# Patient Record
Sex: FEMALE | Race: WHITE | Employment: FULL TIME | URBAN - METROPOLITAN AREA
[De-identification: names, ages, dates, MRNs, and addresses within clinical notes are randomized per-mention and may not be internally consistent; named-entity substitution may affect disease eponyms.]

---

## 2024-08-07 RX ORDER — DOCUSATE SODIUM 100 MG/1
100 CAPSULE, LIQUID FILLED ORAL 2 TIMES DAILY PRN
COMMUNITY

## 2024-08-09 NOTE — H&P
HISTORY AND PHYSICAL             Date: 8/9/2024        Patient Name: Genie Kovacs     YOB: 1970      Age:  54 y.o.    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  She presents for excision with frozen section control and local reconstruction.    Past Medical History   History reviewed. No pertinent past medical history.     Past Surgical History     Past Surgical History:   Procedure Laterality Date    BREAST SURGERY      benign  bilaterally    COLONOSCOPY          Medications Prior to Admission     Prior to Admission medications    Medication Sig Start Date End Date Taking? Authorizing Provider   docusate sodium (COLACE) 100 MG capsule Take 1 capsule by mouth 2 times daily as needed for Constipation   Yes Provider, Historical, MD        Allergies   Penicillins and Zithromax [azithromycin]    Social History     Social History       Tobacco History       Smoking Status  Never      Smokeless Tobacco Use  Never              Alcohol History       Alcohol Use Status  Not Asked              Drug Use       Drug Use Status  Not Asked              Sexual Activity       Sexually Active  Not Asked                    Family History   History reviewed. No pertinent family history.    Review of Systems   Review of Systems    Physical Exam   Ht 1.829 m (6')   Wt 61.2 kg (135 lb)   BMI 18.31 kg/m²     Physical Exam  Heart regular rate rhythm  Lungs clear to auscultation bilaterally  Raised nodular nonpigmented skin lesions right medial canthal area and left lateral nasal sidewall, probable basal cell carcinomas    Labs    No results found for this or any previous visit (from the past 24 hour(s)).     Imaging/Diagnostics Last 24 Hours   No results

## 2024-08-12 ENCOUNTER — HOSPITAL ENCOUNTER (OUTPATIENT)
Age: 54
Setting detail: OUTPATIENT SURGERY
Discharge: HOME | End: 2024-08-12
Attending: PLASTIC SURGERY | Admitting: PLASTIC SURGERY
Payer: COMMERCIAL

## 2024-08-12 VITALS
BODY MASS INDEX: 18.28 KG/M2 | DIASTOLIC BLOOD PRESSURE: 61 MMHG | HEART RATE: 59 BPM | TEMPERATURE: 97 F | RESPIRATION RATE: 16 BRPM | OXYGEN SATURATION: 99 % | SYSTOLIC BLOOD PRESSURE: 112 MMHG | HEIGHT: 72 IN | WEIGHT: 135 LBS

## 2024-08-12 DIAGNOSIS — C44.111 BASAL CELL CARCINOMA (BCC) OF RIGHT ORBIT: ICD-10-CM

## 2024-08-12 DIAGNOSIS — C44.311 BASAL CELL CARCINOMA OF LEFT NASAL SIDEWALL: ICD-10-CM

## 2024-08-12 PROCEDURE — 7100000010 HC PHASE II RECOVERY - FIRST 15 MIN: Performed by: PLASTIC SURGERY

## 2024-08-12 PROCEDURE — 3600000004 HC SURGERY LEVEL 4 BASE: Performed by: PLASTIC SURGERY

## 2024-08-12 PROCEDURE — 2709999900 HC NON-CHARGEABLE SUPPLY: Performed by: PLASTIC SURGERY

## 2024-08-12 PROCEDURE — 6360000002 HC RX W HCPCS: Performed by: PLASTIC SURGERY

## 2024-08-12 PROCEDURE — 3600000014 HC SURGERY LEVEL 4 ADDTL 15MIN: Performed by: PLASTIC SURGERY

## 2024-08-12 PROCEDURE — 6370000000 HC RX 637 (ALT 250 FOR IP): Performed by: PLASTIC SURGERY

## 2024-08-12 PROCEDURE — 7100000011 HC PHASE II RECOVERY - ADDTL 15 MIN: Performed by: PLASTIC SURGERY

## 2024-08-12 PROCEDURE — 2500000003 HC RX 250 WO HCPCS: Performed by: PLASTIC SURGERY

## 2024-08-12 PROCEDURE — 88305 TISSUE EXAM BY PATHOLOGIST: CPT

## 2024-08-12 RX ORDER — GINSENG 100 MG
CAPSULE ORAL PRN
Status: DISCONTINUED | OUTPATIENT
Start: 2024-08-12 | End: 2024-08-12 | Stop reason: ALTCHOICE

## 2024-08-12 ASSESSMENT — PAIN - FUNCTIONAL ASSESSMENT
PAIN_FUNCTIONAL_ASSESSMENT: 0-10

## 2024-08-12 NOTE — OP NOTE
Operative Note      Patient: Genie Kovacs  YOB: 1970  MRN: [id number]    Date of Procedure: 8/12/2024      Preoperative diagnosis #1: Probable basal cell carcinoma left lateral nasal sidewall  Preoperative diagnosis #2: Probable basal cell carcinoma right medial canthal area      Post-Op Diagnosis: Same       Procedure #1: Excision of probable basal cell carcinoma left lateral nasal sidewall measuring 1 x 1 cm  Procedure #2: Flap reconstruction left lateral nasal sidewall measuring 4 cm²  Procedure #3: Excision of probable basal cell carcinoma right medial canthal area measuring 1.2 x 1 cm  Procedure #4: Flap reconstruction right medial canthal area measuring 4.5 cm²    Surgeon(s):  Terence Pruitt MD    Assistant:   * No surgical staff found *    Anesthesia: Local    Estimated Blood Loss (mL): Minimal    Complications: None    Specimens:   ID Type Source Tests Collected by Time Destination   A : BASAL CELL CARCINOMA LEFT NASAL SIDEWALL STITCH AT 12 O'CLOCK Tissue Tissue SURGICAL PATHOLOGY Terence Pruitt MD 8/12/2024 0756    B : BASAL CELL CARCINOMA RIGHT MEDIAL CANTHAL AREA STITCH AT 12 O'CLOCK Tissue Tissue SURGICAL PATHOLOGY Terence Pruitt MD 8/12/2024 0830        Implants:  * No implants in log *      Drains: * No LDAs found *    Findings:  Infection Present At Time Of Surgery (PATOS) (choose all levels that have infection present):  No infection present  Other Findings: none  This procedure was not performed to treat primary cutaneous melanoma through wide local excision    Detailed Description of Procedure:     This is a 54-year-old female with what appears to be a basal cell carcinomas of the left lateral nasal sidewall and right medial canthal area.  Complete excision of these probable skin cancers are recommended.  Originally the patient was scheduled to undergo excision with frozen section control.  However, due to a scheduling error, the pathologist was not available.  Prior

## 2024-08-12 NOTE — DISCHARGE INSTRUCTIONS
No strenuous activity.  Keep dressings clean dry and intact.  Tylenol and or ibuprofen as needed for pain control.  Sponge bathe only until seen in follow-up.    Follow-up with me tomorrow.       Infection After Surgery: Care Instructions  Overview  After surgery, an infection is always possible. It doesn't mean that the surgery didn't go well.  Because an infection can be serious, your doctor has taken steps to manage it.  Your doctor checked the infection and cleaned it if necessary. Your doctor may have made an opening in the area so that the pus can drain out. You may have gauze in the cut so that the area will stay open and keep draining. You may need antibiotics.  You will need to follow up with your doctor to make sure the infection has gone away.  Follow-up care is a key part of your treatment and safety. Be sure to make and go to all appointments, and call your doctor if you are having problems. It's also a good idea to know your test results and keep a list of the medicines you take.  How can you care for yourself at home?  Make sure your surgeon knows about the infection, especially if you saw another doctor about your symptoms.  If your doctor prescribed antibiotics, take them as directed. Do not stop taking them just because you feel better. You need to take the full course of antibiotics.  Ask your doctor if you can take an over-the-counter pain medicine, such as acetaminophen (Tylenol), ibuprofen (Advil, Motrin), or naproxen (Aleve). Be safe with medicines. Read and follow all instructions on the label.  Do not take two or more pain medicines at the same time unless the doctor told you to. Many pain medicines have acetaminophen, which is Tylenol. Too much acetaminophen (Tylenol) can be harmful.  Prop up the area on a pillow anytime you sit or lie down during the next 3 days. Try to keep it above the level of your heart. This will help reduce swelling.  Keep the skin clean and dry.  You may have a

## 2024-08-20 LAB — SURGICAL PATHOLOGY REPORT: NORMAL

## (undated) DEVICE — Device

## (undated) DEVICE — SUTURE ETHILON SZ 5-0 L18IN NONABSORBABLE BLK P-3 L13MM 3/8 698G

## (undated) DEVICE — NEPTUNE E-SEP SMOKE EVACUATION PENCIL, COATED, 70MM BLADE, PUSH BUTTON SWITCH: Brand: NEPTUNE E-SEP

## (undated) DEVICE — DRESSING GZ XRFRM 4X4(25/BX 6BX/CS)

## (undated) DEVICE — NEEDLE HYPO 25GA L1.5IN BLU POLYPR HUB S STL REG BVL STR

## (undated) DEVICE — COVER,LIGHT HANDLE,FLX,2/PK: Brand: MEDLINE INDUSTRIES, INC.

## (undated) DEVICE — ELECTRODE PT RET AD L9FT HI MOIST COND ADH HYDRGEL CORDED

## (undated) DEVICE — TOWEL,OR,DSP,ST,BLUE,STD,6/PK,12PK/CS: Brand: MEDLINE

## (undated) DEVICE — 4-PORT MANIFOLD: Brand: NEPTUNE 2

## (undated) DEVICE — GAUZE,SPONGE,4"X4",12PLY,STERILE,LF,2'S: Brand: MEDLINE

## (undated) DEVICE — PACK,EENT,TURBAN DRAPE,PK II: Brand: MEDLINE

## (undated) DEVICE — DRESSING PETRO W3XL3IN OIL EMUL N ADH GZ KNIT IMPREG CELOS

## (undated) DEVICE — PADDING,UNDERCAST,COTTON, 4"X4YD STERILE: Brand: MEDLINE

## (undated) DEVICE — NEEDLE HYPO 18GA L1.5IN PNK POLYPR HUB S STL REG BVL STR

## (undated) DEVICE — PEN: MARKING STD 100/CS: Brand: MEDICAL ACTION INDUSTRIES

## (undated) DEVICE — GOWN,SIRUS,NON REINFRCD,LARGE,SET IN SL: Brand: MEDLINE

## (undated) DEVICE — ELECTRODE ELECSURG L 10.2 CM PTFE COAT MONOPOLAR BLADE OPN

## (undated) DEVICE — STERILE LATEX POWDER-FREE SURGICAL GLOVESWITH NITRILE COATING: Brand: PROTEXIS

## (undated) DEVICE — PACK PROC ORTH LO EXT IX CUST

## (undated) DEVICE — 12 ML SYRINGE,LUER-LOCK TIP: Brand: MONOJECT

## (undated) DEVICE — APPLICATOR COTTONTIP 6IN NS 1000 CA

## (undated) DEVICE — GAUZE,SPONGE,4"X4",16PLY,STRL,LF,10/TRAY: Brand: MEDLINE

## (undated) DEVICE — TUBING, SUCTION, 3/16" X 10', STRAIGHT: Brand: MEDLINE

## (undated) DEVICE — CONTROL SYRINGE LUER-LOCK TIP: Brand: MONOJECT

## (undated) DEVICE — BANDAGE,SELF ADHRNT,COFLEX,4"X5YD,STRL: Brand: COLABEL

## (undated) DEVICE — BLADE ES L2.5IN PTFE STD TIP BOVIE E-Z CLN

## (undated) DEVICE — BLADE,STAINLESS-STEEL,15,STRL,DISPOSABLE: Brand: MEDLINE

## (undated) DEVICE — BNDG,ELSTC,MATRIX,STRL,4"X5YD,LF,HOOK&LP: Brand: MEDLINE

## (undated) DEVICE — SUTURE PERMAHAND SZ 5-0 L18IN NONABSORBABLE BLK L13MM P-3 640G

## (undated) DEVICE — SOLUTION IRRIG 1000ML 0.9% SOD CHL USP POUR PLAS BTL